# Patient Record
(demographics unavailable — no encounter records)

---

## 2017-01-01 NOTE — NBDCN
===========================

Datetime: 2017 07:48

===========================

   

Nsy Prov Gen Appearance:  Within Normal Limits

Nsy Prov Skin:  Within Normal Limits

Nsy Prov Neuro:  Normal Tone; Pietro; Grasp; Root; Suck

Nsy Prov Musculoskeletal:  Within Normal Limits; Full Range of Motion; Spontaneous Movement All Extre
mities; Intact Clavicles; Clavicles without Crepitus; Gluteal Folds Symmetrical; Spine Within Normal 
Limits; No Sacral Dimple/Cyst

Nsy Prov Head:  Normal Fontanelles; Normocephalic; Sutures WNL

Nsy Prov EENT:  Mouth Within Normal Limits; Ears Within Normal Limits; Eyes Within Normal Limits; Eye
s Red Reflex Bilaterally; Nose Within Normal Limits; Face Within Normal Limits

Nsy Prov Cardiovascular:  Within Normal Limits; Normal Pulses

Nsy Prov Respiratory:  Within Normal Limits

Nsy Prov GI:  Within Normal Limits; Soft; Normal Liver; Non Palpable Spleen; Patent Anus

Nsy Prov Umbilicus:  Within Normal Limits; Three Vessel Cord

Nsy Prov :  Normal Male Genitalia

Nsy Prov Discharge:  Discharge Home Today; Healthy Term ; Vital Signs Appropriate; Bonding Justin
ropriately; Voiding and Stooling; Follow Bilirubin Values

Nsy Prov Disch Comments:  Ft male AGA doing well after repeat CS

   Low-int risk for hyperbilirubinemia

   Follow up with PMD in 1-2 days. 

   Return to ER if can't see PMD. 

   Frequent feeding and exposure to lights. 

   

===========================

Datetime: 2017 23:00

===========================

   

Lab, Bilirubin Transcutaneous:  10.7

Peak Bilirubin Transcutaneous:  10.7

Formula Type:  Similac Advance

Lab, Bilirubin Transcutaneous DT:  2017 23:45

   

===========================

Datetime: 2017 20:50

===========================

   

 Screenin2017 20:50 (Annotations: PKU done. Slip no. [id number])

   

===========================

Datetime: 2017 20:46

===========================

   

Hepatitis B Vaccine NB:  2017 00:00 (Annotations: Hepatitis B vaccine injection given to LAT. L
ot. [id number]; Exp. date: 2018; Maker: [employer] (Correction: LAT not in RAT as in MAR))


   

===========================

Datetime: 2017 20:30

===========================

   

Congenital Heart Screen:  Negative, Congenital Heart Screen Complete

   

===========================

Datetime: 2017 20:00

===========================

   

Blood Type:  A Positive

Lab, Direct Jacklyn:  Negative

   

===========================

Datetime: 2017 11:36

===========================

   

Infant Birthdate and Time:  2017 08:44

Infant Sex - 1:  Male

Gestational Age at Deliv:  39.6

Method of Delivery:  

Vacuum Extraction:  N/A

Forceps:  N/A

Mother's Steroids Given:  None

Apgar Score 1, NB:  9

Apgar Score5, NB:  9

Maternal Amniotic Fluid Color:  Clear

Mother's Blood Type:  A Positive

Mother's Hepatitis B:  Negative

Mother's Gonorrhea:  Negative

Mother's Chlamydia:  Negative

Mother's RPR/VDRL:  Nonreactive

Mother's HIV+ Exposure Test MBL:  Negative

Mother's Hx Herpes:  No

Mother's Rubella:  Immune

Admission Birthweight, NB:  3655

Infant Weight (lb) MBL:  8

Infant Weight (oz) MBL:  1

Maternal Feeding Preference:  Both

   

===========================

Datetime: 2017 09:42

===========================

   

Hearing Screen Result, NB:  Right Ear Pass; Left Ear Pass

Hearing Screen Status:  Hearing Screen Complete

   

===========================

Datetime: 2017 09:22

===========================

   

Nsy Prov  Details:  Bilateral Hydrocele

   

===========================

Datetime: 2017 08:44

===========================

   

Length cms, NB:  50.80

Length in, NB:  20.00

Head Circumference (cm), NB:  36.50

Chest Circumference, NB:  35.00

## 2017-01-01 NOTE — NBDCN
===========================

Datetime: 2017 19:28

===========================

   

Nsy Prov Gen Appearance:  Within Normal Limits

Nsy Prov Skin:  Within Normal Limits

Nsy Prov Neuro:  Normal Tone; Pietro; Grasp; Root; Suck

Nsy Prov Musculoskeletal:  Within Normal Limits; Full Range of Motion; Spontaneous Movement All Extre
mities; Intact Clavicles; Clavicles without Crepitus; Gluteal Folds Symmetrical; Spine Within Normal 
Limits; No Sacral Dimple/Cyst

Nsy Prov Head:  Normal Fontanelles; Normocephalic; Sutures WNL

Nsy Prov EENT:  Mouth Within Normal Limits; Ears Within Normal Limits; Eyes Within Normal Limits; Eye
s Red Reflex Bilaterally; Nose Within Normal Limits; Face Within Normal Limits

Nsy Prov Cardiovascular:  Within Normal Limits; Normal Pulses

Nsy Prov Respiratory:  Within Normal Limits

Nsy Prov GI:  Within Normal Limits; Soft; Normal Liver; Non Palpable Spleen; Patent Anus

Nsy Prov Umbilicus:  Within Normal Limits; Three Vessel Cord

Nsy Prov :  Normal Male Genitalia

Nsy Prov Discharge:  Discharge Home Today; Healthy Term ; Vital Signs Appropriate; Bonding Justin
ropriately; Voiding and Stooling; Appropriate Weight Loss; Follow Bilirubin Values

Nsy Prov Disch Comments:  FT male AGA, born via RCS and doing well.

   Hyperbilirubinemia: low intermediate risk. 

   Follow up with PMD in 1-2 days. 

      

   

===========================

Datetime: 2017 07:15

===========================

   

Hearing Screen Status:  Hearing Screen Complete

Formula Type:  Similac Advance

   

===========================

Datetime: 2017 23:00

===========================

   

Peak Bilirubin Transcutaneous:  10.7

   

===========================

Datetime: 2017 11:36

===========================

   

Apgar Score 1, NB:  9

Apgar Score5, NB:  9

Infant Weight (lb) MBL:  8

Infant Weight (oz) MBL:  1

   

===========================

Datetime: 2017 09:18

===========================

   

Discharge Weight gms NB:  3465

Discharge Weight lbs NB:  7

Discharge Weight oz NB:  10

Follow up in Weeks NB:  1-2 days

Disch Follow Up With:  MAHESH

Follow up Appt with NB:  Clinic

## 2017-01-01 NOTE — NBPN
===========================

Datetime: 2017 09:22

===========================

   

Nsy Prov Gen Appearance:  Within Normal Limits

Nsy Prov Skin:  Within Normal Limits

Nsy Prov Neuro:  Normal Tone; Pietro; Grasp; Root; Suck

Nsy Prov Musculoskeletal:  Within Normal Limits; Full Range of Motion; Spontaneous Movement All Extre
mities; Intact Clavicles; Clavicles without Crepitus; Gluteal Folds Symmetrical; Spine Within Normal 
Limits; No Sacral Dimple/Cyst

Nsy Prov Head:  Normal Fontanelles; Normocephalic; Sutures WNL

Nsy Prov EENT:  Mouth Within Normal Limits; Ears Within Normal Limits; Eyes Within Normal Limits; Eye
s Red Reflex Bilaterally; Nose Within Normal Limits; Face Within Normal Limits

Nsy Prov Cardiovascular:  Within Normal Limits; Normal Pulses

Nsy Prov Respiratory:  Within Normal Limits

Nsy Prov GI:  Within Normal Limits; Soft; Normal Liver; Non Palpable Spleen; Patent Anus

Nsy Prov Umbilicus:  Within Normal Limits; Three Vessel Cord

Nsy Prov :  Normal Male Genitalia; Hydrocele

Nsy Prov  Details:  Bilateral Hydrocele

Nsy Prov Impression:  Healthy Term ; Vital Signs Appropriate; Bonding Appropriately

Nsy Prov Plan:  Continue [place] [facility]

Nsy Prov Impression/Plan Details:  Term Male AGA

   Repeat Scheduled 

   Bilateral Hydrocele

## 2017-01-01 NOTE — NBPN
===========================

Datetime: 2017 10:07

===========================

   

Nsy Prov Gen Appearance:  Within Normal Limits

Nsy Prov Skin:  Within Normal Limits

Nsy Prov Neuro:  Normal Tone; Pietro; Grasp; Root; Suck

Nsy Prov Musculoskeletal:  Within Normal Limits; Full Range of Motion; Spontaneous Movement All Extre
mities; Intact Clavicles; Clavicles without Crepitus; Gluteal Folds Symmetrical; Spine Within Normal 
Limits; No Sacral Dimple/Cyst

Nsy Prov Head:  Normal Fontanelles; Normocephalic; Sutures WNL

Nsy Prov EENT:  Mouth Within Normal Limits; Ears Within Normal Limits; Eyes Within Normal Limits; Eye
s Red Reflex Bilaterally; Nose Within Normal Limits; Face Within Normal Limits

Nsy Prov Cardiovascular:  Within Normal Limits; Normal Pulses

Nsy Prov Respiratory:  Within Normal Limits

Nsy Prov GI:  Within Normal Limits; Soft; Normal Liver; Non Palpable Spleen; Patent Anus

Nsy Prov Umbilicus:  Within Normal Limits; Three Vessel Cord

Nsy Prov :  Normal Male Genitalia

Nsy Prov Impression:  Healthy Term [place]; Vital Signs Appropriate; Bonding Appropriately; Voiding a
nd Stooling

Nsy Prov Plan:  Continue  Care

Nsy Prov Impression/Plan Details:  term  male

## 2017-01-01 NOTE — NBPN
===========================

Datetime: 2017 08:44

===========================

   

Nsy Prov Gen Appearance:  Within Normal Limits

Nsy Prov Skin:  Within Normal Limits

Nsy Prov Neuro:  Normal Tone; Pietro; Grasp; Root; Suck

Nsy Prov Musculoskeletal:  Within Normal Limits; Full Range of Motion; Spontaneous Movement All Extre
mities; Intact Clavicles; Clavicles without Crepitus; Gluteal Folds Symmetrical; Spine Within Normal 
Limits; No Sacral Dimple/Cyst

Nsy Prov Head:  Normal Fontanelles; Normocephalic; Sutures WNL

Nsy Prov EENT:  Mouth Within Normal Limits; Ears Within Normal Limits; Eyes Within Normal Limits; Eye
s Red Reflex Bilaterally; Nose Within Normal Limits; Face Within Normal Limits

Nsy Prov Cardiovascular:  Within Normal Limits; Normal Pulses

Nsy Prov Respiratory:  Within Normal Limits

Nsy Prov GI:  Within Normal Limits; Soft; Normal Liver; Non Palpable Spleen; Patent Anus

Nsy Prov Umbilicus:  Within Normal Limits; Three Vessel Cord

Nsy Prov :  Normal Male Genitalia

Nsy Prov Impression:  Healthy Term [place]; Vital Signs Appropriate; Bonding Appropriately; Voiding a
nd Stooling

Nsy Prov Plan:  Continue  Care

Nsy Prov Impression/Plan Details:  term  male

## 2017-01-01 NOTE — DELATT
===========================

Datetime: 2017 09:18

===========================

   

Del Note Time:  20

Del Note Status:  Term Male AGA

   Bilateral Hydrocele

Del Note Attendant Role 1:  MD [place] Note Attendant 1:  Eugeniashweta De La Rosa Note Reason for Attend Other:  Repeat  Scheduled

Del Note Interventions:  Assessment; Stimulation; Drying

Del Note Reason for Attending:   Section

EDEL/NICU Del Atten Note Adm DT:  2017 09:21